# Patient Record
Sex: FEMALE | Race: WHITE | URBAN - METROPOLITAN AREA
[De-identification: names, ages, dates, MRNs, and addresses within clinical notes are randomized per-mention and may not be internally consistent; named-entity substitution may affect disease eponyms.]

---

## 2021-06-15 ENCOUNTER — OFFICE VISIT (OUTPATIENT)
Dept: URBAN - METROPOLITAN AREA CLINIC 33 | Facility: CLINIC | Age: 61
End: 2021-06-15
Payer: COMMERCIAL

## 2021-06-15 DIAGNOSIS — H43.393 OTHER VITREOUS OPACITIES, BILATERAL: ICD-10-CM

## 2021-06-15 DIAGNOSIS — H04.123 DRY EYE SYNDROME OF BILATERAL LACRIMAL GLANDS: ICD-10-CM

## 2021-06-15 DIAGNOSIS — H25.13 AGE-RELATED NUCLEAR CATARACT, BILATERAL: Primary | ICD-10-CM

## 2021-06-15 PROCEDURE — 99204 OFFICE O/P NEW MOD 45 MIN: CPT | Performed by: OPHTHALMOLOGY

## 2021-06-15 ASSESSMENT — INTRAOCULAR PRESSURE
OD: 15
OS: 15

## 2021-06-15 ASSESSMENT — KERATOMETRY
OD: 44.63
OS: 44.75

## 2021-06-15 ASSESSMENT — VISUAL ACUITY
OS: 20/60
OD: 20/20

## 2021-06-15 NOTE — IMPRESSION/PLAN
Impression: Other vitreous opacities, bilateral: H43.393. Plan: Discussed rt/rd precaution. Advised patient to call us if patient has onset of new floaters and flashes of light. Monitor. Patient to call us sooner PRN onset of new visual changes.

## 2021-06-15 NOTE — IMPRESSION/PLAN
Impression: Age-related nuclear cataract, bilateral: H25.13. Plan: Patients cataract are visually significant and affecting patients daily activities. Okay to proceed with cataract surgery. Discussed risks, benefits and alternatives to surgery including but not limited to: bleeding, infection, risk of vision loss, loss of the eye, need for other surgery. Patient voiced understanding and wishes to proceed. Discussed due to [facility] cataract may need yag in future LEFT EYE ONLY FOR NOW**patient does not qualify for surgery in RIGHT EYE**
RL2
LENS: ok for all technology AIM: -0.25 KOTA: if desires ORA: if desires DEXYCU OU Pt understands will need glasses for BCVA after Sx.

## 2021-06-15 NOTE — IMPRESSION/PLAN
Impression: Dry eye syndrome of bilateral lacrimal glands: H04.123. Plan: use tears qid and gel qhs ou.

## 2021-07-06 ENCOUNTER — SURGERY (OUTPATIENT)
Dept: URBAN - METROPOLITAN AREA SURGERY 15 | Facility: SURGERY | Age: 61
End: 2021-07-06
Payer: COMMERCIAL

## 2021-07-06 PROCEDURE — 66984 XCAPSL CTRC RMVL W/O ECP: CPT | Performed by: OPHTHALMOLOGY

## 2021-07-07 ENCOUNTER — POST-OPERATIVE VISIT (OUTPATIENT)
Dept: URBAN - METROPOLITAN AREA CLINIC 17 | Facility: CLINIC | Age: 61
End: 2021-07-07
Payer: COMMERCIAL

## 2021-07-07 DIAGNOSIS — Z48.810 ENCOUNTER FOR SURGICAL AFTERCARE FOLLOWING SURGERY ON A SENSE ORGAN: Primary | ICD-10-CM

## 2021-07-07 PROCEDURE — 99024 POSTOP FOLLOW-UP VISIT: CPT | Performed by: OPTOMETRIST

## 2021-07-07 ASSESSMENT — INTRAOCULAR PRESSURE
OD: 13
OS: 21

## 2021-07-07 NOTE — IMPRESSION/PLAN
Impression: S/P Cataract Extraction by phacoemulsification with IOL placement; Dexycu OS - 1 Day. Encounter for surgical aftercare following surgery on a sense organ  Z48.810. Plan: 1 WK PO2 --Advised patient to use artificial tears for comfort.

## 2021-07-13 ENCOUNTER — POST-OPERATIVE VISIT (OUTPATIENT)
Dept: URBAN - METROPOLITAN AREA CLINIC 33 | Facility: CLINIC | Age: 61
End: 2021-07-13

## 2021-07-13 PROCEDURE — 99024 POSTOP FOLLOW-UP VISIT: CPT | Performed by: OPHTHALMOLOGY

## 2021-07-13 ASSESSMENT — INTRAOCULAR PRESSURE
OS: 14
OD: 10

## 2021-07-13 ASSESSMENT — VISUAL ACUITY: OS: 20/20

## 2021-07-13 NOTE — IMPRESSION/PLAN
Impression: S/P Cataract Extraction by phacoemulsification with IOL placement; Dexycu OS - 7 Days. Encounter for surgical aftercare following surgery on a sense organ  Z48.810. Plan: Well positioned PC IOL(s). Will continue to monitor.  RTC in 3 weeks for final PO / MRx /DFE no drops

## 2021-08-03 ENCOUNTER — OFFICE VISIT (OUTPATIENT)
Dept: URBAN - METROPOLITAN AREA CLINIC 33 | Facility: CLINIC | Age: 61
End: 2021-08-03

## 2021-08-03 DIAGNOSIS — H25.11 AGE-RELATED NUCLEAR CATARACT, RIGHT EYE: ICD-10-CM

## 2021-08-03 PROCEDURE — 99024 POSTOP FOLLOW-UP VISIT: CPT | Performed by: OPHTHALMOLOGY

## 2021-08-03 ASSESSMENT — INTRAOCULAR PRESSURE
OD: 12
OS: 12

## 2021-08-03 ASSESSMENT — VISUAL ACUITY
OD: 20/20
OS: 20/20

## 2021-08-10 ENCOUNTER — SURGERY (OUTPATIENT)
Dept: URBAN - METROPOLITAN AREA SURGERY 15 | Facility: SURGERY | Age: 61
End: 2021-08-10
Payer: COMMERCIAL

## 2021-08-10 PROCEDURE — 66984 XCAPSL CTRC RMVL W/O ECP: CPT | Performed by: OPHTHALMOLOGY

## 2021-08-11 ENCOUNTER — POST-OPERATIVE VISIT (OUTPATIENT)
Dept: URBAN - METROPOLITAN AREA CLINIC 23 | Facility: CLINIC | Age: 61
End: 2021-08-11

## 2021-08-11 DIAGNOSIS — Z96.1 PRESENCE OF INTRAOCULAR LENS: Primary | ICD-10-CM

## 2021-08-11 PROCEDURE — 99024 POSTOP FOLLOW-UP VISIT: CPT | Performed by: OPTOMETRIST

## 2021-08-11 ASSESSMENT — INTRAOCULAR PRESSURE
OS: 18
OD: 19

## 2021-08-11 NOTE — IMPRESSION/PLAN
Impression: S/P Cataract Extraction by phacoemulsification with IOL placement; DEXYCU OD - 1 Day. Presence of intraocular lens  Z96.1. Post operative instructions reviewed - Plan: Return as scheduled. Sooner if sudden vision change or increasing pain. Discussed pt. may see floater/Dexycu.

## 2021-08-17 ENCOUNTER — POST-OPERATIVE VISIT (OUTPATIENT)
Dept: URBAN - METROPOLITAN AREA CLINIC 23 | Facility: CLINIC | Age: 61
End: 2021-08-17

## 2021-08-17 PROCEDURE — 99024 POSTOP FOLLOW-UP VISIT: CPT | Performed by: OPTOMETRIST

## 2021-08-17 ASSESSMENT — INTRAOCULAR PRESSURE
OS: 15
OD: 18

## 2021-08-17 ASSESSMENT — VISUAL ACUITY
OS: 20/20
OD: 20/20

## 2021-08-17 NOTE — IMPRESSION/PLAN
Impression: S/P Cataract Extraction by phacoemulsification with IOL placement; DEXYCU OD - 7 Days. Presence of intraocular lens  Z96.1. Excellent post op course   Post operative instructions reviewed - Condition is improving - Plan: Pt edu. Appropriate appearance and IOP. RTC 3-4 wks for PO3 and Refraction. --Advised patient to use artificial tears for comfort.

## 2021-09-13 ENCOUNTER — POST-OPERATIVE VISIT (OUTPATIENT)
Dept: URBAN - METROPOLITAN AREA CLINIC 23 | Facility: CLINIC | Age: 61
End: 2021-09-13

## 2021-09-13 PROCEDURE — 99024 POSTOP FOLLOW-UP VISIT: CPT | Performed by: OPTOMETRIST

## 2021-09-13 ASSESSMENT — INTRAOCULAR PRESSURE
OD: 19
OS: 20

## 2021-09-13 ASSESSMENT — VISUAL ACUITY
OD: 20/20
OS: 20/20

## 2021-09-13 NOTE — IMPRESSION/PLAN
Impression: S/P Cataract Extraction by phacoemulsification with IOL placement; DEXYCU OD - 34 Days. Presence of intraocular lens  Z96.1. Excellent post op course   Post operative instructions reviewed - Plan: Pt edu. Appropriate appearance and IOP. Call with any issues. --Advised patient to use artificial tears for comfort.

## 2022-06-29 ENCOUNTER — OFFICE VISIT (OUTPATIENT)
Dept: URBAN - METROPOLITAN AREA CLINIC 23 | Facility: CLINIC | Age: 62
End: 2022-06-29
Payer: COMMERCIAL

## 2022-06-29 DIAGNOSIS — H43.813 VITREOUS DEGENERATION, BILATERAL: ICD-10-CM

## 2022-06-29 DIAGNOSIS — H26.493 OTHER SECONDARY CATARACT, BILATERAL: Primary | ICD-10-CM

## 2022-06-29 PROCEDURE — 99213 OFFICE O/P EST LOW 20 MIN: CPT | Performed by: OPTOMETRIST

## 2022-06-29 ASSESSMENT — KERATOMETRY
OD: 45.00
OS: 44.88

## 2022-06-29 ASSESSMENT — INTRAOCULAR PRESSURE
OS: 18
OD: 14

## 2022-06-29 NOTE — IMPRESSION/PLAN
Impression: Other secondary cataract, bilateral: H26.493. Plan: Discussed diagnosis in detail with patient. The patient will monitor vision changes and contact us with any decrease in vision. Continue to monitor.